# Patient Record
Sex: MALE | Race: WHITE | NOT HISPANIC OR LATINO | URBAN - METROPOLITAN AREA
[De-identification: names, ages, dates, MRNs, and addresses within clinical notes are randomized per-mention and may not be internally consistent; named-entity substitution may affect disease eponyms.]

---

## 2018-02-06 ENCOUNTER — IMPORTED ENCOUNTER (OUTPATIENT)
Dept: URBAN - METROPOLITAN AREA CLINIC 50 | Facility: CLINIC | Age: 73
End: 2018-02-06

## 2018-10-16 ENCOUNTER — IMPORTED ENCOUNTER (OUTPATIENT)
Dept: URBAN - METROPOLITAN AREA CLINIC 50 | Facility: CLINIC | Age: 73
End: 2018-10-16

## 2021-04-17 ASSESSMENT — VISUAL ACUITY
OD_CC: 20/30
OS_BAT: 20/50
OD_BAT: 20/40
OD_OTHER: 20/40. 20/60.
OS_CC: 20/25
OD_CC: 20/25+
OS_OTHER: 20/50. 20/80.

## 2021-04-17 ASSESSMENT — TONOMETRY
OD_IOP_MMHG: 17
OS_IOP_MMHG: 20
OD_IOP_MMHG: 18
OS_IOP_MMHG: 17

## 2022-07-29 NOTE — PATIENT DISCUSSION
+/- Progression VF OS if IOP stay same possible SLT. Repeat VF test.
+/- progression VF OS.
Continue current management.
Monitor.
Patient achieved a 15% reduction in IOP from pretreatment levels or a plan is in place to achieve this goal.
Patient understands condition, prognosis and need for follow up care.
Recommended artificial tears to use: 1 drop 4x a day in both eyes.
The IOP is in the target range.
No
